# Patient Record
Sex: FEMALE | Race: BLACK OR AFRICAN AMERICAN | NOT HISPANIC OR LATINO | Employment: FULL TIME | URBAN - METROPOLITAN AREA
[De-identification: names, ages, dates, MRNs, and addresses within clinical notes are randomized per-mention and may not be internally consistent; named-entity substitution may affect disease eponyms.]

---

## 2023-11-22 ENCOUNTER — APPOINTMENT (OUTPATIENT)
Dept: PRIMARY CARE | Facility: CLINIC | Age: 58
End: 2023-11-22

## 2023-11-22 PROBLEM — F41.9 ANXIETY DISORDER: Status: ACTIVE | Noted: 2023-11-22

## 2023-11-22 RX ORDER — SERTRALINE HYDROCHLORIDE 25 MG/1
1 TABLET, FILM COATED ORAL DAILY
COMMUNITY
Start: 2021-01-26 | End: 2024-01-29 | Stop reason: ALTCHOICE

## 2024-01-29 ENCOUNTER — OFFICE VISIT (OUTPATIENT)
Dept: PRIMARY CARE | Facility: CLINIC | Age: 59
End: 2024-01-29
Payer: COMMERCIAL

## 2024-01-29 VITALS
SYSTOLIC BLOOD PRESSURE: 178 MMHG | BODY MASS INDEX: 51.91 KG/M2 | WEIGHT: 293 LBS | RESPIRATION RATE: 16 BRPM | HEIGHT: 63 IN | TEMPERATURE: 98.2 F | DIASTOLIC BLOOD PRESSURE: 100 MMHG | HEART RATE: 63 BPM

## 2024-01-29 DIAGNOSIS — Z00.00 ROUTINE GENERAL MEDICAL EXAMINATION AT A HEALTH CARE FACILITY: ICD-10-CM

## 2024-01-29 DIAGNOSIS — R03.0 ELEVATED BLOOD PRESSURE READING: Primary | ICD-10-CM

## 2024-01-29 DIAGNOSIS — Z12.11 ENCOUNTER FOR SCREENING FOR MALIGNANT NEOPLASM OF COLON: ICD-10-CM

## 2024-01-29 DIAGNOSIS — Z12.31 ENCOUNTER FOR SCREENING MAMMOGRAM FOR MALIGNANT NEOPLASM OF BREAST: ICD-10-CM

## 2024-01-29 PROCEDURE — 1036F TOBACCO NON-USER: CPT | Performed by: FAMILY MEDICINE

## 2024-01-29 PROCEDURE — 99396 PREV VISIT EST AGE 40-64: CPT | Performed by: FAMILY MEDICINE

## 2024-01-29 NOTE — ASSESSMENT & PLAN NOTE
Although blood pressure has never been a problem for her in the past she is significantly elevated today for no particular reason.  She is not complaining of any symptoms and the rest of her cardiovascular exam is completely normal.  We will initiate blood work checking for underlying causes of hypertension.  She will get interval readings on a daily basis and follow-up in 2 weeks to review and discuss whether treatment is necessary.  There is a strong family history of high blood pressure and her mother.

## 2024-01-29 NOTE — PATIENT INSTRUCTIONS
You were instructed to use a home blood pressure device to record your blood pressure daily. Record the readings and bring them with you for your next visit so we can review. Try and get recordings at different times each day so we get a better overall view of your blood pressure during the day.

## 2024-01-29 NOTE — PROGRESS NOTES
Subjective   Meghana Munson is a 58 y.o. female who presents for a wellness visit.  HPI    Review of Systems  Visit Vitals  BP (!) 178/100   Pulse 63   Temp 36.8 °C (98.2 °F)   Resp 16      Objective   Physical Exam  Constitutional:       Appearance: Normal appearance.   HENT:      Head: Normocephalic.   Eyes:      Conjunctiva/sclera: Conjunctivae normal.   Cardiovascular:      Rate and Rhythm: Normal rate and regular rhythm.   Pulmonary:      Effort: Pulmonary effort is normal.      Breath sounds: Normal breath sounds.   Musculoskeletal:      Cervical back: Neck supple.   Skin:     General: Skin is warm and dry.   Neurological:      Mental Status: She is alert.       Assessment/Plan    Meghana was seen today for annual exam.  Diagnoses and all orders for this visit:  Elevated blood pressure reading  -     Follow Up [facility] - PCP - Established; Future  Encounter for screening for malignant neoplasm of colon  -     Colonoscopy Screening; Average Risk Patient; Future  Encounter for screening mammogram for malignant neoplasm of breast  -     BI mammo bilateral screening tomosynthesis; Future  Routine general medical examination at a health care facility  -     CBC; Future  -     Comprehensive Metabolic Panel; Future  -     TSH with reflex to Free T4 if abnormal; Future  -     Hepatitis C Antibody; Future  -     Lipid Panel; Future  -     Referral to Obstetrics / Gynecology; Future       Elevated blood pressure reading  Although blood pressure has never been a problem for her in the past she is significantly elevated today for no particular reason.  She is not complaining of any symptoms and the rest of her cardiovascular exam is completely normal.  We will initiate blood work checking for underlying causes of hypertension.  She will get interval readings on a daily basis and follow-up in 2 weeks to review and discuss whether treatment is necessary.  There is a strong family history of high blood pressure  and her mother.

## 2024-02-05 ENCOUNTER — HOSPITAL ENCOUNTER (OUTPATIENT)
Dept: RADIOLOGY | Facility: HOSPITAL | Age: 59
Discharge: HOME | End: 2024-02-05
Payer: COMMERCIAL

## 2024-02-05 DIAGNOSIS — Z12.31 ENCOUNTER FOR SCREENING MAMMOGRAM FOR MALIGNANT NEOPLASM OF BREAST: ICD-10-CM

## 2024-02-05 PROCEDURE — 77063 BREAST TOMOSYNTHESIS BI: CPT

## 2024-02-05 PROCEDURE — 77067 SCR MAMMO BI INCL CAD: CPT | Performed by: RADIOLOGY

## 2024-02-05 PROCEDURE — 77063 BREAST TOMOSYNTHESIS BI: CPT | Performed by: RADIOLOGY

## 2024-02-05 NOTE — RESULT ENCOUNTER NOTE
Please let Meghana Munson know her mammogram was normal.  We recommend repeating this screen in 1 year.

## 2024-02-08 ENCOUNTER — LAB (OUTPATIENT)
Dept: LAB | Facility: LAB | Age: 59
End: 2024-02-08
Payer: COMMERCIAL

## 2024-02-08 DIAGNOSIS — Z00.00 ROUTINE GENERAL MEDICAL EXAMINATION AT A HEALTH CARE FACILITY: ICD-10-CM

## 2024-02-08 LAB
ALBUMIN SERPL BCP-MCNC: 4 G/DL (ref 3.4–5)
ALP SERPL-CCNC: 64 U/L (ref 33–110)
ALT SERPL W P-5'-P-CCNC: 16 U/L (ref 7–45)
ANION GAP SERPL CALC-SCNC: 11 MMOL/L (ref 10–20)
AST SERPL W P-5'-P-CCNC: 15 U/L (ref 9–39)
BILIRUB SERPL-MCNC: 0.5 MG/DL (ref 0–1.2)
BUN SERPL-MCNC: 13 MG/DL (ref 6–23)
CALCIUM SERPL-MCNC: 9.2 MG/DL (ref 8.6–10.3)
CHLORIDE SERPL-SCNC: 104 MMOL/L (ref 98–107)
CHOLEST SERPL-MCNC: 186 MG/DL (ref 0–199)
CHOLESTEROL/HDL RATIO: 7
CO2 SERPL-SCNC: 32 MMOL/L (ref 21–32)
CREAT SERPL-MCNC: 0.86 MG/DL (ref 0.5–1.05)
EGFRCR SERPLBLD CKD-EPI 2021: 78 ML/MIN/1.73M*2
ERYTHROCYTE [DISTWIDTH] IN BLOOD BY AUTOMATED COUNT: 14.7 % (ref 11.5–14.5)
GLUCOSE SERPL-MCNC: 115 MG/DL (ref 74–99)
HCT VFR BLD AUTO: 43.5 % (ref 36–46)
HCV AB SER QL: NONREACTIVE
HDLC SERPL-MCNC: 26.6 MG/DL
HGB BLD-MCNC: 13.8 G/DL (ref 12–16)
LDLC SERPL CALC-MCNC: 127 MG/DL
MCH RBC QN AUTO: 30.8 PG (ref 26–34)
MCHC RBC AUTO-ENTMCNC: 31.7 G/DL (ref 32–36)
MCV RBC AUTO: 97 FL (ref 80–100)
NON HDL CHOLESTEROL: 159 MG/DL (ref 0–149)
NRBC BLD-RTO: 0 /100 WBCS (ref 0–0)
PLATELET # BLD AUTO: 260 X10*3/UL (ref 150–450)
POTASSIUM SERPL-SCNC: 4.4 MMOL/L (ref 3.5–5.3)
PROT SERPL-MCNC: 6.4 G/DL (ref 6.4–8.2)
RBC # BLD AUTO: 4.48 X10*6/UL (ref 4–5.2)
SODIUM SERPL-SCNC: 143 MMOL/L (ref 136–145)
TRIGL SERPL-MCNC: 161 MG/DL (ref 0–149)
TSH SERPL-ACNC: 2.73 MIU/L (ref 0.44–3.98)
VLDL: 32 MG/DL (ref 0–40)
WBC # BLD AUTO: 5.5 X10*3/UL (ref 4.4–11.3)

## 2024-02-08 PROCEDURE — 36415 COLL VENOUS BLD VENIPUNCTURE: CPT

## 2024-02-08 PROCEDURE — 80053 COMPREHEN METABOLIC PANEL: CPT

## 2024-02-08 PROCEDURE — 80061 LIPID PANEL: CPT

## 2024-02-08 PROCEDURE — 85027 COMPLETE CBC AUTOMATED: CPT

## 2024-02-08 PROCEDURE — 86803 HEPATITIS C AB TEST: CPT

## 2024-02-08 PROCEDURE — 84443 ASSAY THYROID STIM HORMONE: CPT

## 2024-02-09 NOTE — RESULT ENCOUNTER NOTE
This is all essentially normal.  There is a very mild elevation in your sugar or glucose which we can follow-up at your next office visit.

## 2024-02-12 ENCOUNTER — OFFICE VISIT (OUTPATIENT)
Dept: PRIMARY CARE | Facility: CLINIC | Age: 59
End: 2024-02-12
Payer: COMMERCIAL

## 2024-02-12 VITALS
RESPIRATION RATE: 18 BRPM | BODY MASS INDEX: 51.91 KG/M2 | HEART RATE: 84 BPM | HEIGHT: 63 IN | TEMPERATURE: 98.3 F | DIASTOLIC BLOOD PRESSURE: 84 MMHG | SYSTOLIC BLOOD PRESSURE: 152 MMHG | WEIGHT: 293 LBS

## 2024-02-12 DIAGNOSIS — I10 HTN (HYPERTENSION), BENIGN: Primary | ICD-10-CM

## 2024-02-12 DIAGNOSIS — R03.0 ELEVATED BLOOD PRESSURE READING: ICD-10-CM

## 2024-02-12 DIAGNOSIS — R73.03 PREDIABETES: ICD-10-CM

## 2024-02-12 LAB — POC HEMOGLOBIN A1C: 6 % (ref 4.2–6.5)

## 2024-02-12 PROCEDURE — 3079F DIAST BP 80-89 MM HG: CPT | Performed by: FAMILY MEDICINE

## 2024-02-12 PROCEDURE — 1036F TOBACCO NON-USER: CPT | Performed by: FAMILY MEDICINE

## 2024-02-12 PROCEDURE — 3077F SYST BP >= 140 MM HG: CPT | Performed by: FAMILY MEDICINE

## 2024-02-12 PROCEDURE — 83036 HEMOGLOBIN GLYCOSYLATED A1C: CPT | Performed by: FAMILY MEDICINE

## 2024-02-12 PROCEDURE — 99214 OFFICE O/P EST MOD 30 MIN: CPT | Performed by: FAMILY MEDICINE

## 2024-02-12 RX ORDER — AMLODIPINE BESYLATE 5 MG/1
5 TABLET ORAL DAILY
Qty: 30 TABLET | Refills: 5 | Status: SHIPPED | OUTPATIENT
Start: 2024-02-12 | End: 2024-05-06 | Stop reason: SDUPTHER

## 2024-02-12 NOTE — ASSESSMENT & PLAN NOTE
Lab Results   Component Value Date    HGBA1C 6.0 02/12/2024     Glucose was mildly elevated on screening and A1c indicates prediabetes.  We discussed the importance of reducing carbohydrates and sugars going forward and we will check the A1c again in 6 months

## 2024-02-12 NOTE — ASSESSMENT & PLAN NOTE
Interval readings are consistent with our readings here in the office indicating that she has developed hypertension.  Lab work shows normal thyroid studies, kidney numbers and a previous EKG was normal.  We will initiate treatment with a starting dose of amlodipine 5 mg daily.  She will continue to monitor blood pressure and follow-up in 3 months

## 2024-02-12 NOTE — PROGRESS NOTES
Subjective   Meghana Munson is a 58 y.o. female who presents for Hypertension.  HPI         Meghana Munson is here for a hypertension follow-up:    Medication treatment:  not currently on medications for this problem  Recent BP readings: usual 150s/90s  Symptoms: no cough, headache, blurred vision, chest pain, or shortness of breath   Visit Vitals  /84   Pulse 84   Temp 36.8 °C (98.3 °F)   Resp 18      Objective   Physical Exam  Constitutional:       Appearance: Normal appearance.   HENT:      Head: Normocephalic.   Eyes:      Conjunctiva/sclera: Conjunctivae normal.   Cardiovascular:      Rate and Rhythm: Normal rate and regular rhythm.   Pulmonary:      Effort: Pulmonary effort is normal.      Breath sounds: Normal breath sounds.   Musculoskeletal:      Cervical back: Neck supple.   Skin:     General: Skin is warm and dry.   Neurological:      Mental Status: She is alert.       Assessment/Plan      Problem List Items Addressed This Visit       HTN (hypertension), benign - Primary     Interval readings are consistent with our readings here in the office indicating that she has developed hypertension.  Lab work shows normal thyroid studies, kidney numbers and a previous EKG was normal.  We will initiate treatment with a starting dose of amlodipine 5 mg daily.  She will continue to monitor blood pressure and follow-up in 3 months         Relevant Medications    amLODIPine (Norvasc) 5 mg tablet    Other Relevant Orders    Follow Up [facility] - PCP - Established    Prediabetes     Lab Results   Component Value Date    HGBA1C 6.0 02/12/2024   Glucose was mildly elevated on screening and A1c indicates prediabetes.  We discussed the importance of reducing carbohydrates and sugars going forward and we will check the A1c again in 6 months

## 2024-04-03 ENCOUNTER — TELEPHONE (OUTPATIENT)
Dept: GASTROENTEROLOGY | Facility: CLINIC | Age: 59
End: 2024-04-03
Payer: COMMERCIAL

## 2024-05-06 ENCOUNTER — OFFICE VISIT (OUTPATIENT)
Dept: PRIMARY CARE | Facility: CLINIC | Age: 59
End: 2024-05-06
Payer: COMMERCIAL

## 2024-05-06 VITALS
TEMPERATURE: 97.5 F | HEART RATE: 60 BPM | RESPIRATION RATE: 16 BRPM | BODY MASS INDEX: 51.91 KG/M2 | HEIGHT: 63 IN | WEIGHT: 293 LBS | DIASTOLIC BLOOD PRESSURE: 92 MMHG | SYSTOLIC BLOOD PRESSURE: 142 MMHG

## 2024-05-06 DIAGNOSIS — I10 HTN (HYPERTENSION), BENIGN: ICD-10-CM

## 2024-05-06 PROCEDURE — 99214 OFFICE O/P EST MOD 30 MIN: CPT | Performed by: FAMILY MEDICINE

## 2024-05-06 PROCEDURE — 3077F SYST BP >= 140 MM HG: CPT | Performed by: FAMILY MEDICINE

## 2024-05-06 PROCEDURE — 1036F TOBACCO NON-USER: CPT | Performed by: FAMILY MEDICINE

## 2024-05-06 PROCEDURE — 3080F DIAST BP >= 90 MM HG: CPT | Performed by: FAMILY MEDICINE

## 2024-05-06 RX ORDER — AMLODIPINE BESYLATE 10 MG/1
10 TABLET ORAL DAILY
Qty: 30 TABLET | Refills: 5 | Status: SHIPPED | OUTPATIENT
Start: 2024-05-06 | End: 2024-11-02

## 2024-05-06 NOTE — PROGRESS NOTES
Subjective   Meghana Munson is a 58 y.o. female who presents for Hypertension.  HPI         Meghana Munson is here for a hypertension follow-up:    Medication treatment:  taking as prescribed  Recent BP readings: not doing  Symptoms: no cough, headache, blurred vision, chest pain, or shortness of breath   Visit Vitals  BP (!) 142/92   Pulse 60   Temp 36.4 °C (97.5 °F)   Resp 16      Objective   Physical Exam  Constitutional:       Appearance: Normal appearance.   HENT:      Head: Normocephalic.   Eyes:      Conjunctiva/sclera: Conjunctivae normal.   Cardiovascular:      Rate and Rhythm: Normal rate and regular rhythm.      Heart sounds: Normal heart sounds.   Pulmonary:      Effort: Pulmonary effort is normal.      Breath sounds: Normal breath sounds.   Musculoskeletal:      Cervical back: Neck supple.      Right lower leg: No edema.      Left lower leg: No edema.   Skin:     General: Skin is warm and dry.   Neurological:      Mental Status: She is alert.       Assessment/Plan      Problem List Items Addressed This Visit       HTN (hypertension), benign     Unfortunately blood pressure has risen above therapeutic range on this visit and the prior visit as well.  I believe we need an increased dose of medication so we will increase her amlodipine from 5 mg to 10 mg and follow-up in 3 months to check.  We discussed the fact that this can cause some minor swelling of the ankles.  She has no edema currently on exam.  If she gets significant ankle edema she will call back for a med adjustment         Relevant Medications    amLODIPine (Norvasc) 10 mg tablet    Other Relevant Orders    Follow [facility] - PCP - Established          Please excuse any errors in grammar or translation related to this dictation. Voice recognition software was utilized to prepare this document.

## 2024-05-06 NOTE — ASSESSMENT & PLAN NOTE
Unfortunately blood pressure has risen above therapeutic range on this visit and the prior visit as well.  I believe we need an increased dose of medication so we will increase her amlodipine from 5 mg to 10 mg and follow-up in 3 months to check.  We discussed the fact that this can cause some minor swelling of the ankles.  She has no edema currently on exam.  If she gets significant ankle edema she will call back for a med adjustment

## 2024-08-26 ENCOUNTER — APPOINTMENT (OUTPATIENT)
Dept: PRIMARY CARE | Facility: CLINIC | Age: 59
End: 2024-08-26
Payer: COMMERCIAL

## 2024-08-26 VITALS
HEIGHT: 63 IN | HEART RATE: 64 BPM | WEIGHT: 293 LBS | TEMPERATURE: 98.2 F | DIASTOLIC BLOOD PRESSURE: 94 MMHG | RESPIRATION RATE: 18 BRPM | BODY MASS INDEX: 51.91 KG/M2 | SYSTOLIC BLOOD PRESSURE: 136 MMHG

## 2024-08-26 DIAGNOSIS — I10 HTN (HYPERTENSION), BENIGN: ICD-10-CM

## 2024-08-26 DIAGNOSIS — R73.03 PREDIABETES: ICD-10-CM

## 2024-08-26 LAB — POC HEMOGLOBIN A1C: 6.5 % (ref 4.2–6.5)

## 2024-08-26 PROCEDURE — 1036F TOBACCO NON-USER: CPT | Performed by: FAMILY MEDICINE

## 2024-08-26 PROCEDURE — 3008F BODY MASS INDEX DOCD: CPT | Performed by: FAMILY MEDICINE

## 2024-08-26 PROCEDURE — 99214 OFFICE O/P EST MOD 30 MIN: CPT | Performed by: FAMILY MEDICINE

## 2024-08-26 PROCEDURE — 3075F SYST BP GE 130 - 139MM HG: CPT | Performed by: FAMILY MEDICINE

## 2024-08-26 PROCEDURE — 83036 HEMOGLOBIN GLYCOSYLATED A1C: CPT | Performed by: FAMILY MEDICINE

## 2024-08-26 PROCEDURE — 3080F DIAST BP >= 90 MM HG: CPT | Performed by: FAMILY MEDICINE

## 2024-08-26 RX ORDER — AMLODIPINE BESYLATE 10 MG/1
10 TABLET ORAL DAILY
Qty: 30 TABLET | Refills: 11 | Status: SHIPPED | OUTPATIENT
Start: 2024-08-26

## 2024-08-26 RX ORDER — LOSARTAN POTASSIUM 50 MG/1
50 TABLET ORAL DAILY
Qty: 30 TABLET | Refills: 11 | Status: SHIPPED | OUTPATIENT
Start: 2024-08-26 | End: 2025-08-26

## 2024-08-26 NOTE — ASSESSMENT & PLAN NOTE
Blood pressure is significantly better after titrating up on amlodipine but is still not at goal with a diastolic of 94.  Will go ahead and add 50 mg of losartan, continue to monitor, and follow-up in 3 months to check blood pressure again  Orders:    Follow Up [facility] - PCP - Established    losartan (Cozaar) 50 mg tablet; Take 1 tablet (50 mg) by mouth once daily.    amLODIPine (Norvasc) 10 mg tablet; Take 1 tablet (10 mg) by mouth once daily.    Follow Up [facility] - PCP - Established; Future

## 2024-08-26 NOTE — ASSESSMENT & PLAN NOTE
Unfortunately the A1c has risen from 6.0% to 6.5% today.  We discussed the importance of reducing carbohydrates and sugars in the diet to stop this progression and hopefully reverse it.  We will bring her back in 3 months to check another A1c to ensure that she is not rising above 7%  Orders:    POCT glycosylated hemoglobin (Hb A1C) manually resulted

## 2024-08-26 NOTE — PROGRESS NOTES
Subjective   Meghana Munson is a 59 y.o. female who presents for Hypertension.     HPI         Meghana Munson is here for a hypertension follow-up:    Medication treatment:  taking as prescribed  Recent BP readings: not doing  Symptoms: no cough, headache, blurred vision, chest pain, or shortness of breath   Visit Vitals  BP (!) 136/94   Pulse 64   Temp 36.8 °C (98.2 °F)   Resp 18      Objective   Physical Exam  Constitutional:       Appearance: Normal appearance.   HENT:      Head: Normocephalic.   Eyes:      Conjunctiva/sclera: Conjunctivae normal.   Cardiovascular:      Rate and Rhythm: Normal rate and regular rhythm.      Heart sounds: Normal heart sounds.   Pulmonary:      Effort: Pulmonary effort is normal.      Breath sounds: Normal breath sounds.   Musculoskeletal:      Cervical back: Neck supple.   Skin:     General: Skin is warm and dry.   Neurological:      Mental Status: She is alert.            Assessment & Plan  Prediabetes  Unfortunately the A1c has risen from 6.0% to 6.5% today.  We discussed the importance of reducing carbohydrates and sugars in the diet to stop this progression and hopefully reverse it.  We will bring her back in 3 months to check another A1c to ensure that she is not rising above 7%  Orders:    POCT glycosylated hemoglobin (Hb A1C) manually resulted    HTN (hypertension), benign  Blood pressure is significantly better after titrating up on amlodipine but is still not at goal with a diastolic of 94.  Will go ahead and add 50 mg of losartan, continue to monitor, and follow-up in 3 months to check blood pressure again  Orders:    Follow [facility] - PCP - Established    losartan (Cozaar) 50 mg tablet; Take 1 tablet (50 mg) by mouth once daily.    amLODIPine (Norvasc) 10 mg tablet; Take 1 tablet (10 mg) by mouth once daily.    Follow [facility] - PCP - Established; Future           Please excuse any errors in grammar or translation related to this  dictation. Voice recognition software was utilized to prepare this document.

## 2024-09-20 ENCOUNTER — APPOINTMENT (OUTPATIENT)
Dept: PRIMARY CARE | Facility: CLINIC | Age: 59
End: 2024-09-20
Payer: COMMERCIAL

## 2024-09-20 VITALS
WEIGHT: 293 LBS | SYSTOLIC BLOOD PRESSURE: 147 MMHG | RESPIRATION RATE: 20 BRPM | BODY MASS INDEX: 51.91 KG/M2 | DIASTOLIC BLOOD PRESSURE: 88 MMHG | HEIGHT: 63 IN | HEART RATE: 82 BPM | TEMPERATURE: 98.2 F

## 2024-09-20 DIAGNOSIS — E11.9 TYPE 2 DIABETES MELLITUS WITHOUT COMPLICATION, WITHOUT LONG-TERM CURRENT USE OF INSULIN (MULTI): ICD-10-CM

## 2024-09-20 DIAGNOSIS — I10 HTN (HYPERTENSION), BENIGN: ICD-10-CM

## 2024-09-20 DIAGNOSIS — E66.01 CLASS 3 SEVERE OBESITY DUE TO EXCESS CALORIES WITH SERIOUS COMORBIDITY AND BODY MASS INDEX (BMI) OF 50.0 TO 59.9 IN ADULT: Primary | ICD-10-CM

## 2024-09-20 PROBLEM — E66.813 CLASS 3 SEVERE OBESITY DUE TO EXCESS CALORIES WITH SERIOUS COMORBIDITY AND BODY MASS INDEX (BMI) OF 50.0 TO 59.9 IN ADULT: Status: ACTIVE | Noted: 2024-09-20

## 2024-09-20 PROCEDURE — 3077F SYST BP >= 140 MM HG: CPT | Performed by: FAMILY MEDICINE

## 2024-09-20 PROCEDURE — 3049F LDL-C 100-129 MG/DL: CPT | Performed by: FAMILY MEDICINE

## 2024-09-20 PROCEDURE — 3008F BODY MASS INDEX DOCD: CPT | Performed by: FAMILY MEDICINE

## 2024-09-20 PROCEDURE — 99214 OFFICE O/P EST MOD 30 MIN: CPT | Performed by: FAMILY MEDICINE

## 2024-09-20 PROCEDURE — 3079F DIAST BP 80-89 MM HG: CPT | Performed by: FAMILY MEDICINE

## 2024-09-20 PROCEDURE — 4010F ACE/ARB THERAPY RXD/TAKEN: CPT | Performed by: FAMILY MEDICINE

## 2024-09-20 PROCEDURE — 1036F TOBACCO NON-USER: CPT | Performed by: FAMILY MEDICINE

## 2024-09-20 NOTE — PROGRESS NOTES
Subjective   Meghana Munson is a 59 y.o. female who presents for Obesity.     HPI        She is here today to discuss weight loss medication.     Visit Vitals  /88   Pulse 82   Temp 36.8 °C (98.2 °F)   Resp 20      Objective   Physical Exam  Constitutional:       Appearance: Normal appearance.   HENT:      Head: Normocephalic.   Pulmonary:      Effort: Pulmonary effort is normal.   Musculoskeletal:      Cervical back: Neck supple.   Skin:     General: Skin is warm and dry.   Psychiatric:         Mood and Affect: Mood normal.            Assessment & Plan  Class 3 severe obesity due to excess calories with serious comorbidity and body mass index (BMI) of 50.0 to 59.9 in adult (Multi)  This 59-year-old female has struggled with weight her entire life and currently has a body mass index of 55.4.  Additionally she went from prediabetic to diabetic at her last visit with an A1c above 6.4%.  She is interested in more aggressive weight loss management to both help with the diabetes, hypertension, and obesity.  We discussed the benefits of GLP-1 medication which I think would be ideal for her.  I discussed the pros and cons and what to expect with this medication.  We will start her at low-dose Zepbound and enroll her in our pharmacy consultation service to help with the titration and/or prior authorization.  Orders:    tirzepatide, weight loss, (Zepbound) 2.5 mg/0.5 mL injection; Inject 2.5 mg under the skin every 7 days.    Follow Up [facility] - Pharmacy; Future    Type 2 diabetes mellitus without complication, without long-term current use of insulin (Multi)    Orders:    tirzepatide, weight loss, (Zepbound) 2.5 mg/0.5 mL injection; Inject 2.5 mg under the skin every 7 days.    Follow Up [facility] - Pharmacy; Future    HTN (hypertension), benign                Please excuse any errors in grammar or translation related to this dictation. Voice recognition software was utilized to prepare  this document.

## 2024-09-20 NOTE — ASSESSMENT & PLAN NOTE
Orders:    tirzepatide, weight loss, (Zepbound) 2.5 mg/0.5 mL injection; Inject 2.5 mg under the skin every 7 days.    Follow Up [facility] - Pharmacy; Future

## 2024-09-20 NOTE — ASSESSMENT & PLAN NOTE
This 59-year-old female has struggled with weight her entire life and currently has a body mass index of 55.4.  Additionally she went from prediabetic to diabetic at her last visit with an A1c above 6.4%.  She is interested in more aggressive weight loss management to both help with the diabetes, hypertension, and obesity.  We discussed the benefits of GLP-1 medication which I think would be ideal for her.  I discussed the pros and cons and what to expect with this medication.  We will start her at low-dose Zepbound and enroll her in our pharmacy consultation service to help with the titration and/or prior authorization.  Orders:    tirzepatide, weight loss, (Zepbound) 2.5 mg/0.5 mL injection; Inject 2.5 mg under the skin every 7 days.    Follow Up [facility] - Pharmacy; Future

## 2024-09-27 ENCOUNTER — APPOINTMENT (OUTPATIENT)
Dept: PRIMARY CARE | Facility: CLINIC | Age: 59
End: 2024-09-27
Payer: COMMERCIAL

## 2024-10-11 ENCOUNTER — APPOINTMENT (OUTPATIENT)
Dept: PHARMACY | Facility: HOSPITAL | Age: 59
End: 2024-10-11
Payer: COMMERCIAL

## 2024-10-11 DIAGNOSIS — E11.9 TYPE 2 DIABETES MELLITUS WITHOUT COMPLICATION, WITHOUT LONG-TERM CURRENT USE OF INSULIN (MULTI): ICD-10-CM

## 2024-10-11 DIAGNOSIS — E66.813 CLASS 3 SEVERE OBESITY DUE TO EXCESS CALORIES WITH SERIOUS COMORBIDITY AND BODY MASS INDEX (BMI) OF 50.0 TO 59.9 IN ADULT: Primary | ICD-10-CM

## 2024-10-11 DIAGNOSIS — E66.01 CLASS 3 SEVERE OBESITY DUE TO EXCESS CALORIES WITH SERIOUS COMORBIDITY AND BODY MASS INDEX (BMI) OF 50.0 TO 59.9 IN ADULT: Primary | ICD-10-CM

## 2024-10-11 PROCEDURE — RXMED WILLOW AMBULATORY MEDICATION CHARGE

## 2024-10-11 NOTE — PROGRESS NOTES
[facility] Pharmacist Visit - Weight Management  Meghana Munson is a 59 y.o. female was referred to Clinical Pharmacy Team for Obesity.    Referring Provider: Kings Billingsley MD  PCP: Kings Billingsley MD - last visit: 9/20/24, next visit:     Subjective   HPI  PMH significant for HTN.  Special needs/barriers to therapy: none    WEIGHT MANAGEMENT  BMI Readings from Last 1 Encounters:   09/20/24 55.44 kg/m²   Starting weight: 308 lbs (140 kg)  Current weight: 308 lbs    Lab Results   Component Value Date    HGBA1C 6.5 08/26/2024    GLUCOSE 115 (H) 02/08/2024   Hyperglycemia: Denies signs and symptoms    On zepbound for 3 weeks now. Denies n/v, constipation, diarrhea, or stomach upset. Noticed eating less and appetite suppression. More energy.    Lifestyle  Diet: eats whenever when schedule fits, a lot of eat outs  Snacks: unhealthy foods  Drinks: water  Has worked with nutritionist/dietician? No    Physical Activity: rarely    Other Potential Contributing Factors  Alcohol use: Average 0 drinks/week  Medications that may cause weight gain: none  Mental health: No current concerns  Comorbidities: hypertension    Non-pharmacological therapy  Weight loss techniques attempted:  Self-directed dieting: weight watchers    Pharmacological therapy  Current Medications: Zepbound 2.5mg - for 3 weeks now  Previous Medications: none     Adherence: Takes medication as directed and reports no missed doses  Adverse Effects: Vicodin allergy    Insurance coverage of weight-loss medications? Yes,    Eligible for copay cards/programs? Yes, commercial    Medication Patient Risks/Cautions Notes   Wegovy (semaglutide) None    Zepbound (tirzepatide) None    Saxenda (liraglutide) None Current backorder of starting doses   Qsymia (phentermine-topiramate) None Controlled substance   Contrave (bupropion-naltrexone) None    Adipex (phentermine) None Controlled substance,  Short-term use only   Adrian/Xenical (orlistat)  Adverse effects likely outweigh  benefit.     Denies hx of thyroid, pancreatitis, gastroparesis, or gastric bypass surgery.  Denies hx of seizures or chronic opioid use.      Secondary Prevention  ASCVD Risk:   The 10-year ASCVD risk score (Irma BLOOM, et al., 2019) is: 30.9%    Values used to calculate the score:      Age: 59 years      Sex: Female      Is Non- : Yes      Diabetic: Yes      Tobacco smoker: No      Systolic Blood Pressure: 147 mmHg      Is BP treated: Yes      HDL Cholesterol: 26.6 mg/dL      Total Cholesterol: 186 mg/dL  On Statin?: No,      Immunizations Needed: All up-to-date and documented  Tobacco Use: non-smoker    Objective   Vitals  BP Readings from Last 2 Encounters:   09/20/24 147/88   08/26/24 (!) 136/94     BMI Readings from Last 1 Encounters:   09/20/24 55.44 kg/m²      Labs  A1C  Lab Results   Component Value Date    HGBA1C 6.5 08/26/2024    HGBA1C 6.0 02/12/2024     BMP  Lab Results   Component Value Date    CALCIUM 9.2 02/08/2024     02/08/2024    K 4.4 02/08/2024    CO2 32 02/08/2024     02/08/2024    BUN 13 02/08/2024    CREATININE 0.86 02/08/2024    EGFR 78 02/08/2024     LFTs  Lab Results   Component Value Date    ALT 16 02/08/2024    AST 15 02/08/2024    ALKPHOS 64 02/08/2024    BILITOT 0.5 02/08/2024     FLP  Lab Results   Component Value Date    TRIG 161 (H) 02/08/2024    CHOL 186 02/08/2024    LDLCALC 127 (H) 02/08/2024    HDL 26.6 02/08/2024       Assessment/Plan   Problem List Items Addressed This Visit    None      Weight Management: Current regimen includes Zepbound 2.5mg subcutaneous weekly. Patient's current weight reported as 308 lbs. Due to success, plan to increase dose.  INCREASE dose of Zepbound to 5mg subcutaneous once weekly  Educate on side effects of dose increase  Reinforce patient that to maximize weight loss and prevent return weight gain, patient has to combine GLP-1 with good diet and exercise  DASH Diet  150 mins per week of exercise  FUV in 4 weeks  regarding side effects and dose titration  Continue to focus on lifestyle modifications as discussed.    Patient Education:  Counseled patient on relevant MOA, expectations, side effects, duration of therapy, contraindications, administration, and monitoring parameters.  All questions and concerns addressed. Contact pharmacist with any further questions or concerns prior to next appointment.  Reviewed dietary recommendations: Healthy Plate method    Clinical Pharmacist follow-up: 11/8, Telehealth visit    Seth Vogt PharmD  Clinical Pharmacist  10/11/24    Continue all meds under the continuation of care with the referring provider and clinical pharmacy team.  Verbal consent to manage patient's drug therapy was obtained from the patient. They were informed they may decline to participate or withdraw from participation in pharmacy services at any time.

## 2024-10-14 ENCOUNTER — PHARMACY VISIT (OUTPATIENT)
Dept: PHARMACY | Facility: CLINIC | Age: 59
End: 2024-10-14
Payer: COMMERCIAL

## 2024-10-16 ENCOUNTER — APPOINTMENT (OUTPATIENT)
Dept: PHARMACY | Facility: HOSPITAL | Age: 59
End: 2024-10-16
Payer: COMMERCIAL

## 2024-11-08 ENCOUNTER — APPOINTMENT (OUTPATIENT)
Dept: PHARMACY | Facility: HOSPITAL | Age: 59
End: 2024-11-08
Payer: COMMERCIAL

## 2024-11-08 DIAGNOSIS — E66.01 CLASS 3 SEVERE OBESITY DUE TO EXCESS CALORIES WITH SERIOUS COMORBIDITY AND BODY MASS INDEX (BMI) OF 50.0 TO 59.9 IN ADULT: ICD-10-CM

## 2024-11-08 DIAGNOSIS — E11.9 TYPE 2 DIABETES MELLITUS WITHOUT COMPLICATION, WITHOUT LONG-TERM CURRENT USE OF INSULIN (MULTI): Primary | ICD-10-CM

## 2024-11-08 DIAGNOSIS — E66.813 CLASS 3 SEVERE OBESITY DUE TO EXCESS CALORIES WITH SERIOUS COMORBIDITY AND BODY MASS INDEX (BMI) OF 50.0 TO 59.9 IN ADULT: ICD-10-CM

## 2024-11-08 PROCEDURE — RXMED WILLOW AMBULATORY MEDICATION CHARGE

## 2024-11-08 NOTE — PROGRESS NOTES
[facility] Pharmacist Visit - Weight Management  Meghana Munson is a 59 y.o. female was referred to Clinical Pharmacy Team for Obesity and Diabetes.    Referring Provider: Kings Billingsley MD  PCP: Kings Billingsley MD - last visit: 11/8/2024, next visit: 12/5/2024 @ 14:00    Subjective   HPI  PMH significant for HTN.  Special needs/barriers to therapy: None    WEIGHT MANAGEMENT  BMI Readings from Last 1 Encounters:   09/20/24 55.44 kg/m²   Starting weight: 308 lbs (140 kg)  Current weight: 293 lbs    Lab Results   Component Value Date    HGBA1C 6.5 08/26/2024    GLUCOSE 115 (H) 02/08/2024   Hyperglycemia: Denies signs and symptoms    Lifestyle  Diet: has experienced a decrease in appetite but is making sure she intakes proper amounts of protein and fiber with her meals    Has worked with nutritionist/dietician? No    Physical Activity: Walking 5-6 times/week, average of 7,000-10,000 steps/day, weight lifting and dancing     Other Potential Contributing Factors  Alcohol use: Average 0 drinks/week  Medications that may cause weight gain: none  Mental health: No current concerns  Comorbidities: hypertension    Non-pharmacological therapy  Weight loss techniques attempted:  Self-directed dieting: Weight Watchers    Pharmacological therapy  Current Medications: Zepbound 5 mg/0.5 mL subcutaneous weekly  Previous Medications: none     Adherence: Takes medication as directed and reports no missed doses  Adverse Effects: Vicodin Allergy    Insurance coverage of weight-loss medications? Yes, commercial  Eligible for copay cards/programs? Yes, commercial    Medication Patient Risks/Cautions Notes   Wegovy (semaglutide) None    Zepbound (tirzepatide) None    Saxenda (liraglutide) None Current backorder of starting doses   Qsymia (phentermine-topiramate) None Controlled substance   Contrave (bupropion-naltrexone) None    Adipex (phentermine) None Controlled substance,  Short-term use only   Adrian/Xenical (orlistat)  Adverse effects  likely outweigh benefit.     Denies hx of thyroid, pancreatitis, gastroparesis, or gastric bypass surgery.  Denies hx of seizures or chronic opioid use.      Secondary Prevention  ASCVD Risk:   The 10-year ASCVD risk score (Irma BLOOM, et al., 2019) is: 30.9%    Values used to calculate the score:      Age: 59 years      Sex: Female      Is Non- : Yes      Diabetic: Yes      Tobacco smoker: No      Systolic Blood Pressure: 147 mmHg      Is BP treated: Yes      HDL Cholesterol: 26.6 mg/dL      Total Cholesterol: 186 mg/dL  On Statin?: No,      Immunizations Needed: All up-to-date and documented  Tobacco Use: non-smoker    Objective   Vitals  BP Readings from Last 2 Encounters:   09/20/24 147/88   08/26/24 (!) 136/94     BMI Readings from Last 1 Encounters:   09/20/24 55.44 kg/m²      Labs  A1C  Lab Results   Component Value Date    HGBA1C 6.5 08/26/2024    HGBA1C 6.0 02/12/2024     BMP  Lab Results   Component Value Date    CALCIUM 9.2 02/08/2024     02/08/2024    K 4.4 02/08/2024    CO2 32 02/08/2024     02/08/2024    BUN 13 02/08/2024    CREATININE 0.86 02/08/2024    EGFR 78 02/08/2024     LFTs  Lab Results   Component Value Date    ALT 16 02/08/2024    AST 15 02/08/2024    ALKPHOS 64 02/08/2024    BILITOT 0.5 02/08/2024     FLP  Lab Results   Component Value Date    TRIG 161 (H) 02/08/2024    CHOL 186 02/08/2024    LDLCALC 127 (H) 02/08/2024    HDL 26.6 02/08/2024       Assessment/Plan   Problem List Items Addressed This Visit    None      Weight Management: Current regimen includes Zepbound 5 mg/0.5 mL subcutaneous weekly. Patient's current weight reported as 293 lbs. Has lost 15 lbs (4.8% of TBW) since starting therapy plan. Due to success, plan to increase dose.  INCREASE dose of Zepbound to 7.5mg subcutaneous once weekly  Educate on side effects of dose increase  Reinforce patient that to maximize weight loss and prevent return weight gain, patient has to combine GLP-1 with  good diet and exercise  DASH Diet  150 mins per week of exercise  FUV in 4 weeks regarding side effects and dose titration  Continue to focus on lifestyle modifications as discussed.    Patient Education:  Counseled patient on relevant MOA, expectations, side effects, duration of therapy, contraindications, administration, and monitoring parameters.  All questions and concerns addressed. Contact pharmacist with any further questions or concerns prior to next appointment.  Reviewed dietary recommendations: Healthy Plate method    Clinical Pharmacist follow-up: 12/5/2024 @14:00, Telehealth visit    Seth Vogt PharmD  Pharmacy Student  11/08/24    Continue all meds under the continuation of care with the referring provider and clinical pharmacy team.  Verbal consent to manage patient's drug therapy was obtained from the patient. They were informed they may decline to participate or withdraw from participation in pharmacy services at any time.

## 2024-11-13 ENCOUNTER — PHARMACY VISIT (OUTPATIENT)
Dept: PHARMACY | Facility: CLINIC | Age: 59
End: 2024-11-13
Payer: COMMERCIAL

## 2024-12-02 ENCOUNTER — APPOINTMENT (OUTPATIENT)
Dept: PRIMARY CARE | Facility: CLINIC | Age: 59
End: 2024-12-02
Payer: COMMERCIAL

## 2024-12-02 VITALS
WEIGHT: 282 LBS | DIASTOLIC BLOOD PRESSURE: 78 MMHG | SYSTOLIC BLOOD PRESSURE: 116 MMHG | HEIGHT: 63 IN | RESPIRATION RATE: 16 BRPM | TEMPERATURE: 97.9 F | HEART RATE: 76 BPM | BODY MASS INDEX: 49.96 KG/M2

## 2024-12-02 DIAGNOSIS — E11.9 TYPE 2 DIABETES MELLITUS WITHOUT COMPLICATION, UNSPECIFIED WHETHER LONG TERM INSULIN USE (MULTI): ICD-10-CM

## 2024-12-02 DIAGNOSIS — I10 HTN (HYPERTENSION), BENIGN: ICD-10-CM

## 2024-12-02 LAB — POC HEMOGLOBIN A1C: 5.8 % (ref 4.2–6.5)

## 2024-12-02 PROCEDURE — 1036F TOBACCO NON-USER: CPT | Performed by: FAMILY MEDICINE

## 2024-12-02 PROCEDURE — 3008F BODY MASS INDEX DOCD: CPT | Performed by: FAMILY MEDICINE

## 2024-12-02 PROCEDURE — 4010F ACE/ARB THERAPY RXD/TAKEN: CPT | Performed by: FAMILY MEDICINE

## 2024-12-02 PROCEDURE — 3074F SYST BP LT 130 MM HG: CPT | Performed by: FAMILY MEDICINE

## 2024-12-02 PROCEDURE — 3078F DIAST BP <80 MM HG: CPT | Performed by: FAMILY MEDICINE

## 2024-12-02 PROCEDURE — 3049F LDL-C 100-129 MG/DL: CPT | Performed by: FAMILY MEDICINE

## 2024-12-02 PROCEDURE — 83036 HEMOGLOBIN GLYCOSYLATED A1C: CPT | Performed by: FAMILY MEDICINE

## 2024-12-02 PROCEDURE — 99213 OFFICE O/P EST LOW 20 MIN: CPT | Performed by: FAMILY MEDICINE

## 2024-12-02 NOTE — PROGRESS NOTES
Subjective   Meghana Munson is a 59 y.o. female who presents for Diabetes.  HPI  This is a diabetes follow up visit for Meghana Munson. The current treatment includes  Tirzepatide  and she is compliant most of the time. Symptoms include none. Glucose is monitored: not checking. she reports good compliance with a low carbohydrate diet, and exercises infrequently .    Patient is prescribed an ACE/ARB/ARNI medication   Patient is not prescribed a STATIN medication  Patient is not prescribed a SGLT2/GLP1 medication    Last Flu Vaccine given:                       Last Pneumococcal Vaccine given:      Lab Results   Component Value Date    HGBA1C 6.5 08/26/2024    CREATININE 0.86 02/08/2024    LDLCALC 127 (H) 02/08/2024        Last Eye Exam: 9/2024      Liver Screening: FIB-4 Calculation: 0.84 at 2/8/2024  7:29 AM  Calculated from:  SGOT/AST: 15 U/L at 2/8/2024  7:29 AM  SGPT/ALT: 16 U/L at 2/8/2024  7:29 AM  Platelets: 260 x10*3/uL at 2/8/2024  7:29 AM  Age: 58 years  Interpretation: if<1.45 Cirrhosis less likely, 1.45 - 3.25 Indeterminate, >3.25 Cirrhosis more likely    Review of Systems  Visit Vitals  /78   Pulse 76   Temp 36.6 °C (97.9 °F)   Resp 16   Body mass index is 50.76 kg/m².   Objective   Physical Exam  Constitutional:       Appearance: Normal appearance.   HENT:      Head: Normocephalic.   Eyes:      Conjunctiva/sclera: Conjunctivae normal.   Cardiovascular:      Rate and Rhythm: Normal rate and regular rhythm.      Heart sounds: Normal heart sounds.   Pulmonary:      Effort: Pulmonary effort is normal.      Breath sounds: Normal breath sounds.   Musculoskeletal:      Cervical back: Neck supple.   Skin:     General: Skin is warm and dry.   Neurological:      Mental Status: She is alert.             Assessment & Plan  Type 2 diabetes mellitus without complication, unspecified whether long term insulin use (Multi)  A1c continues to improve and weight continues to decline significantly with the GLP-1  treatment.  She is taking a statin medication and her blood pressure is still tolerating this well.  We discussed the possibility of starting a statin but her diabetes is so well-controlled that I would say the statistical benefit is minimal especially if her A1c drops below 5.7%.  She would like to hold off on this and reevaluate after 6 months and seeing what her weight and A1c does.  Orders:    POCT glycosylated hemoglobin (Hb A1C) manually resulted    Follow Up [facility] - PCP; Future    HTN (hypertension), benign  This is extremely well-controlled with amlodipine and lisinopril.  She is not reporting any hypoglycemia.  We discussed signs and symptoms of this and if she does get hypoglycemia we will reduce the dose of her amlodipine.  Orders:    Follow Up [facility] - PCP - Established    Follow Up [facility] - PCP; Future              Arleen Smith CMA 12/02/24 3:39 PM

## 2024-12-02 NOTE — ASSESSMENT & PLAN NOTE
This is extremely well-controlled with amlodipine and lisinopril.  She is not reporting any hypoglycemia.  We discussed signs and symptoms of this and if she does get hypoglycemia we will reduce the dose of her amlodipine.  Orders:    Follow Up [facility] - PCP - Established    Follow Up [facility] - PCP; Future

## 2024-12-05 ENCOUNTER — APPOINTMENT (OUTPATIENT)
Dept: PHARMACY | Facility: HOSPITAL | Age: 59
End: 2024-12-05
Payer: COMMERCIAL

## 2024-12-05 DIAGNOSIS — E11.9 TYPE 2 DIABETES MELLITUS WITHOUT COMPLICATION, WITHOUT LONG-TERM CURRENT USE OF INSULIN (MULTI): Primary | ICD-10-CM

## 2024-12-05 DIAGNOSIS — E66.01 CLASS 3 SEVERE OBESITY DUE TO EXCESS CALORIES WITH SERIOUS COMORBIDITY AND BODY MASS INDEX (BMI) OF 50.0 TO 59.9 IN ADULT: ICD-10-CM

## 2024-12-05 DIAGNOSIS — E66.813 CLASS 3 SEVERE OBESITY DUE TO EXCESS CALORIES WITH SERIOUS COMORBIDITY AND BODY MASS INDEX (BMI) OF 50.0 TO 59.9 IN ADULT: ICD-10-CM

## 2024-12-05 PROCEDURE — RXMED WILLOW AMBULATORY MEDICATION CHARGE

## 2024-12-05 NOTE — PROGRESS NOTES
[facility] Pharmacist Visit - Weight Management  Meghana Munson is a 59 y.o. female was referred to Clinical Pharmacy Team for Obesity.    Referring Provider: Kings Billingsley MD  PCP: Kings Billingsley MD - last visit: 12/2/24, next visit:     Subjective   HPI  PMH significant for HTN.  Special needs/barriers to therapy: None    WEIGHT MANAGEMENT  BMI Readings from Last 1 Encounters:   12/02/24 50.76 kg/m²   Starting weight: 308 lbs (140 kg)  Current weight: 282 lbs    Lab Results   Component Value Date    HGBA1C 5.8 12/02/2024    GLUCOSE 115 (H) 02/08/2024   Hyperglycemia: Denies signs and symptoms    Denies any side effects.    Non-pharmacological therapy  Weight loss techniques attempted:  Self-directed dieting:      Pharmacological therapy  Current Medications: Zepbound 7.5  Previous Medications: none     Adherence: Takes medication as directed and reports no missed doses  Adverse Effects: none    Insurance coverage of weight-loss medications? Yes,    Eligible for copay cards/programs? Yes,      Medication Patient Risks/Cautions Notes   Wegovy (semaglutide) None    Zepbound (tirzepatide) None    Saxenda (liraglutide) None Current backorder of starting doses   Qsymia (phentermine-topiramate) None Controlled substance   Contrave (bupropion-naltrexone) None    Adipex (phentermine) None Controlled substance,  Short-term use only   Adrian/Xenical (orlistat)  Adverse effects likely outweigh benefit.           Secondary Prevention  ASCVD Risk:   The 10-year ASCVD risk score (Irma DK, et al., 2019) is: 16.4%    Values used to calculate the score:      Age: 59 years      Sex: Female      Is Non- : Yes      Diabetic: Yes      Tobacco smoker: No      Systolic Blood Pressure: 116 mmHg      Is BP treated: Yes      HDL Cholesterol: 26.6 mg/dL      Total Cholesterol: 186 mg/dL      Objective   Vitals  BP Readings from Last 2 Encounters:   12/02/24 116/78   09/20/24 147/88     BMI Readings from Last 1  Encounters:   12/02/24 50.76 kg/m²      Labs  A1C  Lab Results   Component Value Date    HGBA1C 5.8 12/02/2024    HGBA1C 6.5 08/26/2024    HGBA1C 6.0 02/12/2024     BMP  Lab Results   Component Value Date    CALCIUM 9.2 02/08/2024     02/08/2024    K 4.4 02/08/2024    CO2 32 02/08/2024     02/08/2024    BUN 13 02/08/2024    CREATININE 0.86 02/08/2024    EGFR 78 02/08/2024     LFTs  Lab Results   Component Value Date    ALT 16 02/08/2024    AST 15 02/08/2024    ALKPHOS 64 02/08/2024    BILITOT 0.5 02/08/2024     FLP  Lab Results   Component Value Date    TRIG 161 (H) 02/08/2024    CHOL 186 02/08/2024    LDLCALC 127 (H) 02/08/2024    HDL 26.6 02/08/2024       Assessment/Plan   Problem List Items Addressed This Visit    None      Weight Management: Current regimen includes Zepbound 7.5mg. . Patient's current weight reported as 282 lbs. Has lost 29 lbs (8.4% of TBW) since starting therapy plan. Due to success, plan to increase.  INCREASE dose of Zepbound to 10mg subcutaneous once weekly  Educate on side effects of dose increase  Reinforce patient that to maximize weight loss and prevent return weight gain, patient has to combine GLP-1 with good diet and exercise  DASH Diet  150 mins per week of exercise  FUV in 4 weeks regarding side effects and dose titration  Continue to focus on lifestyle modifications as discussed.    Patient Education:  Counseled patient on relevant medication mechanisms of action, expectations, side effects, duration of therapy, contraindications, administration, and monitoring parameters.  All questions and concerns addressed. Contact pharmacist with any further questions or concerns prior to next appointment.  Reviewed dietary recommendations: Healthy Plate method    Clinical Pharmacist follow-up: 3/6, Telehealth visit    Seth Vogt PharmD  Clinical Pharmacist  12/05/24    Continue all meds under the continuation of care with the referring provider and clinical pharmacy team.  Verbal  consent to manage patient's drug therapy was obtained from the patient. They were informed they may decline to participate or withdraw from participation in pharmacy services at any time.

## 2024-12-10 ENCOUNTER — PHARMACY VISIT (OUTPATIENT)
Dept: PHARMACY | Facility: CLINIC | Age: 59
End: 2024-12-10
Payer: COMMERCIAL

## 2024-12-31 PROCEDURE — RXMED WILLOW AMBULATORY MEDICATION CHARGE

## 2025-01-06 ENCOUNTER — PHARMACY VISIT (OUTPATIENT)
Dept: PHARMACY | Facility: CLINIC | Age: 60
End: 2025-01-06
Payer: COMMERCIAL

## 2025-01-28 DIAGNOSIS — E66.813 CLASS 3 SEVERE OBESITY DUE TO EXCESS CALORIES WITH SERIOUS COMORBIDITY AND BODY MASS INDEX (BMI) OF 50.0 TO 59.9 IN ADULT: ICD-10-CM

## 2025-01-28 DIAGNOSIS — E11.9 TYPE 2 DIABETES MELLITUS WITHOUT COMPLICATION, WITHOUT LONG-TERM CURRENT USE OF INSULIN (MULTI): ICD-10-CM

## 2025-01-28 DIAGNOSIS — E66.01 CLASS 3 SEVERE OBESITY DUE TO EXCESS CALORIES WITH SERIOUS COMORBIDITY AND BODY MASS INDEX (BMI) OF 50.0 TO 59.9 IN ADULT: ICD-10-CM

## 2025-01-28 NOTE — TELEPHONE ENCOUNTER
Rx for Zepbound medication will no longer be covered at [facility]  It needs to be sent to [facility]    Please Advise

## 2025-01-29 DIAGNOSIS — E11.9 TYPE 2 DIABETES MELLITUS WITHOUT COMPLICATION, WITHOUT LONG-TERM CURRENT USE OF INSULIN (MULTI): ICD-10-CM

## 2025-01-29 DIAGNOSIS — E66.813 CLASS 3 SEVERE OBESITY DUE TO EXCESS CALORIES WITH SERIOUS COMORBIDITY AND BODY MASS INDEX (BMI) OF 50.0 TO 59.9 IN ADULT: ICD-10-CM

## 2025-01-29 DIAGNOSIS — E66.01 CLASS 3 SEVERE OBESITY DUE TO EXCESS CALORIES WITH SERIOUS COMORBIDITY AND BODY MASS INDEX (BMI) OF 50.0 TO 59.9 IN ADULT: ICD-10-CM

## 2025-01-31 PROCEDURE — RXMED WILLOW AMBULATORY MEDICATION CHARGE

## 2025-02-04 ENCOUNTER — PHARMACY VISIT (OUTPATIENT)
Dept: PHARMACY | Facility: CLINIC | Age: 60
End: 2025-02-04
Payer: COMMERCIAL

## 2025-02-07 ENCOUNTER — HOSPITAL ENCOUNTER (OUTPATIENT)
Dept: RADIOLOGY | Facility: HOSPITAL | Age: 60
Discharge: HOME | End: 2025-02-07
Payer: COMMERCIAL

## 2025-02-07 VITALS — WEIGHT: 282 LBS | HEIGHT: 62 IN | BODY MASS INDEX: 51.89 KG/M2

## 2025-02-07 DIAGNOSIS — Z12.31 SCREENING MAMMOGRAM FOR BREAST CANCER: ICD-10-CM

## 2025-02-07 PROCEDURE — 77067 SCR MAMMO BI INCL CAD: CPT

## 2025-02-25 DIAGNOSIS — E11.9 TYPE 2 DIABETES MELLITUS WITHOUT COMPLICATION, WITHOUT LONG-TERM CURRENT USE OF INSULIN (MULTI): ICD-10-CM

## 2025-02-25 DIAGNOSIS — E66.813 CLASS 3 SEVERE OBESITY DUE TO EXCESS CALORIES WITH SERIOUS COMORBIDITY AND BODY MASS INDEX (BMI) OF 50.0 TO 59.9 IN ADULT: ICD-10-CM

## 2025-02-25 DIAGNOSIS — E66.01 CLASS 3 SEVERE OBESITY DUE TO EXCESS CALORIES WITH SERIOUS COMORBIDITY AND BODY MASS INDEX (BMI) OF 50.0 TO 59.9 IN ADULT: ICD-10-CM

## 2025-02-26 RX ORDER — TIRZEPATIDE 10 MG/.5ML
10 INJECTION, SOLUTION SUBCUTANEOUS
Qty: 6 ML | Refills: 0 | Status: SHIPPED | OUTPATIENT
Start: 2025-02-26

## 2025-03-03 ENCOUNTER — TELEPHONE (OUTPATIENT)
Dept: PRIMARY CARE | Facility: CLINIC | Age: 60
End: 2025-03-03
Payer: COMMERCIAL

## 2025-03-03 NOTE — TELEPHONE ENCOUNTER
Patient states she was told to call if she had any symptoms from the weigh loss and the blood pressures medications    She is having light headedness and is cold and clammy      She states she was told she could cut back on one of the BP meds but she isn't sure which one    Please Advise    [phone]

## 2025-03-06 ENCOUNTER — APPOINTMENT (OUTPATIENT)
Dept: PHARMACY | Facility: HOSPITAL | Age: 60
End: 2025-03-06
Payer: COMMERCIAL

## 2025-03-06 DIAGNOSIS — E11.9 TYPE 2 DIABETES MELLITUS WITHOUT COMPLICATION, WITHOUT LONG-TERM CURRENT USE OF INSULIN (MULTI): Primary | ICD-10-CM

## 2025-03-06 DIAGNOSIS — E66.813 CLASS 3 SEVERE OBESITY DUE TO EXCESS CALORIES WITH SERIOUS COMORBIDITY AND BODY MASS INDEX (BMI) OF 50.0 TO 59.9 IN ADULT: ICD-10-CM

## 2025-03-06 DIAGNOSIS — E66.01 CLASS 3 SEVERE OBESITY DUE TO EXCESS CALORIES WITH SERIOUS COMORBIDITY AND BODY MASS INDEX (BMI) OF 50.0 TO 59.9 IN ADULT: ICD-10-CM

## 2025-03-06 NOTE — PROGRESS NOTES
[facility] Pharmacist Visit - Weight Management  Meghana Munson is a 59 y.o. female was referred to Clinical Pharmacy Team for Obesity.    Referring Provider: Kings Billingsley MD  PCP: Kings Billingsley MD - last visit: 3/5/24, next visit:     Subjective   HPI  PMH significant for HTN.  Special needs/barriers to therapy: none    WEIGHT MANAGEMENT  BMI Readings from Last 1 Encounters:   02/07/25 51.24 kg/m²   Starting weight: 308 lbs (140 kg)  Current weight: 257 lbs    Lab Results   Component Value Date    HGBA1C 5.8 12/02/2024    GLUCOSE 115 (H) 02/08/2024   Hyperglycemia: Denies signs and symptoms    Reported a little constipation.    Non-pharmacological therapy  Weight loss techniques attempted:  Self-directed dieting:      Pharmacological therapy  Current Medications: Zepbound 7.5mg  Previous Medications: none     Adherence: Takes medication as directed and reports no missed doses  Adverse Effects: none    Insurance coverage of weight-loss medications? Yes,    Eligible for copay cards/programs? Yes,      Medication Patient Risks/Cautions Notes   Wegovy (semaglutide) None    Zepbound (tirzepatide) None    Saxenda (liraglutide) None Current backorder of starting doses   Qsymia (phentermine-topiramate) None Controlled substance   Contrave (bupropion-naltrexone) None    Adipex (phentermine) None Controlled substance,  Short-term use only   Adrian/Xenical (orlistat)  Adverse effects likely outweigh benefit.         Secondary Prevention  ASCVD Risk:   The 10-year ASCVD risk score (Irma BLOOM, et al., 2019) is: 16.4%    Values used to calculate the score:      Age: 59 years      Sex: Female      Is Non- : Yes      Diabetic: Yes      Tobacco smoker: No      Systolic Blood Pressure: 116 mmHg      Is BP treated: Yes      HDL Cholesterol: 26.6 mg/dL      Total Cholesterol: 186 mg/dL    Objective   Vitals  BP Readings from Last 2 Encounters:   12/02/24 116/78   09/20/24 147/88     BMI Readings from Last 1  Encounters:   02/07/25 51.24 kg/m²      Labs  A1C  Lab Results   Component Value Date    HGBA1C 5.8 12/02/2024    HGBA1C 6.5 08/26/2024    HGBA1C 6.0 02/12/2024     BMP  Lab Results   Component Value Date    CALCIUM 9.2 02/08/2024     02/08/2024    K 4.4 02/08/2024    CO2 32 02/08/2024     02/08/2024    BUN 13 02/08/2024    CREATININE 0.86 02/08/2024    EGFR 78 02/08/2024     LFTs  Lab Results   Component Value Date    ALT 16 02/08/2024    AST 15 02/08/2024    ALKPHOS 64 02/08/2024    BILITOT 0.5 02/08/2024     FLP  Lab Results   Component Value Date    TRIG 161 (H) 02/08/2024    CHOL 186 02/08/2024    LDLCALC 127 (H) 02/08/2024    HDL 26.6 02/08/2024       Assessment/Plan   Problem List Items Addressed This Visit    None      Weight Management: Current regimen includes Zepbound 10mg.. Patient's current weight reported as 257 lbs. Has lost 51 lbs (16.6% of TBW) since starting therapy plan. Due to success, plan to continue.  Continue taking Zepbound 10mg subcutaneous once weekly  Continue to focus on lifestyle modifications as discussed.    Patient Education:  Counseled patient on relevant medication mechanisms of action, expectations, side effects, duration of therapy, contraindications, administration, and monitoring parameters.  All questions and concerns addressed. Contact pharmacist with any further questions or concerns prior to next appointment.  Reviewed dietary recommendations: Healthy Plate method    Clinical Pharmacist follow-up: 6/5, Telehealth visit    Seth Vogt PharmD  Clinical Pharmacist  03/06/25    Continue all meds under the continuation of care with the referring provider and clinical pharmacy team.  Verbal consent to manage patient's drug therapy was obtained from the patient. They were informed they may decline to participate or withdraw from participation in pharmacy services at any time.

## 2025-03-15 ASSESSMENT — PROMIS GLOBAL HEALTH SCALE
RATE_AVERAGE_FATIGUE: MILD
CARRYOUT_SOCIAL_ACTIVITIES: EXCELLENT
RATE_QUALITY_OF_LIFE: VERY GOOD
CARRYOUT_PHYSICAL_ACTIVITIES: COMPLETELY
RATE_PHYSICAL_HEALTH: GOOD
RATE_GENERAL_HEALTH: VERY GOOD
RATE_SOCIAL_SATISFACTION: VERY GOOD
RATE_MENTAL_HEALTH: EXCELLENT
RATE_AVERAGE_PAIN: 3
EMOTIONAL_PROBLEMS: NEVER

## 2025-03-17 ENCOUNTER — APPOINTMENT (OUTPATIENT)
Dept: PRIMARY CARE | Facility: CLINIC | Age: 60
End: 2025-03-17
Payer: COMMERCIAL

## 2025-05-01 ENCOUNTER — APPOINTMENT (OUTPATIENT)
Dept: PRIMARY CARE | Facility: CLINIC | Age: 60
End: 2025-05-01
Payer: COMMERCIAL

## 2025-05-01 DIAGNOSIS — E11.9 TYPE 2 DIABETES MELLITUS WITHOUT COMPLICATION, UNSPECIFIED WHETHER LONG TERM INSULIN USE: Primary | ICD-10-CM

## 2025-05-01 DIAGNOSIS — R73.9 HYPERGLYCEMIA: ICD-10-CM

## 2025-05-01 DIAGNOSIS — E11.9 TYPE 2 DIABETES MELLITUS WITHOUT COMPLICATION, WITHOUT LONG-TERM CURRENT USE OF INSULIN: ICD-10-CM

## 2025-05-01 DIAGNOSIS — I10 HTN (HYPERTENSION), BENIGN: ICD-10-CM

## 2025-05-01 PROCEDURE — 4010F ACE/ARB THERAPY RXD/TAKEN: CPT | Performed by: FAMILY MEDICINE

## 2025-05-01 PROCEDURE — 99213 OFFICE O/P EST LOW 20 MIN: CPT | Performed by: FAMILY MEDICINE

## 2025-05-01 NOTE — PROGRESS NOTES
Subjective   Meghana Munson is a 59 y.o. female who presents for No chief complaint on file..     HPI         There were no vitals taken for this visit.   Objective   Physical Exam  Constitutional:       Appearance: Normal appearance.   HENT:      Head: Normocephalic.   Pulmonary:      Effort: Pulmonary effort is normal.   Musculoskeletal:      Cervical back: Neck supple.   Skin:     General: Skin is warm and dry.   Psychiatric:         Mood and Affect: Mood normal.       No data recorded     No data recorded   No data recorded   Assessment & Plan  Hyperglycemia         Type 2 diabetes mellitus without complication, unspecified whether long term insulin use  This 59-year-old female has lost about 60 pounds since initiating Zepbound.  Her A1c has reduced into the nondiabetic range.  She continues to do well without side effects and continues to follow with clinical pharmacy.  I strongly congratulated and encouraged her to continue with the current treatment plan.  Her blood pressure is stable today and she is not describing any hypotension spells.  However, if she continues to lose weight at this rate it is likely we will be able to reduce the dose or discontinue one of her blood pressure medicines in the near future.  Orders:    Lipid Panel; Future    Albumin-Creatinine Ratio, Urine Random; Future    Comprehensive Metabolic Panel; Future    CBC; Future    Type 2 diabetes mellitus without complication, without long-term current use of insulin  An A1c has reduced into the nondiabetic range.  We will continue to monitor every 6 months to 1 year       HTN (hypertension), benign  Blood pressure remains well-controlled.  We did discuss the possibility of hypotension and that we may need to reduce the dose of her medicines in the future              Please excuse any errors in grammar or translation related to this dictation. Voice recognition software was utilized to prepare this document.

## 2025-05-01 NOTE — ASSESSMENT & PLAN NOTE
An A1c has reduced into the nondiabetic range.  We will continue to monitor every 6 months to 1 year        TROS[facility]UM  Last Written Prescription Date:  3/28/18  Last Fill Quantity: 40,  # refills: 2   Last office visit: 9/12/2018 with prescribing provider:     Future Office Visit:   Next 5 appointments (look out 90 days)     Jan 09, 2019 11:00 AM CST   [facility] Visit with Martina Andrade MD   [facility]    [address] [place] [id number] [phone]                 YRN Brown

## 2025-05-01 NOTE — ASSESSMENT & PLAN NOTE
Blood pressure remains well-controlled.  We did discuss the possibility of hypotension and that we may need to reduce the dose of her medicines in the future

## 2025-05-02 DIAGNOSIS — E11.9 TYPE 2 DIABETES MELLITUS WITHOUT COMPLICATION, WITHOUT LONG-TERM CURRENT USE OF INSULIN: ICD-10-CM

## 2025-05-02 DIAGNOSIS — E66.813 CLASS 3 SEVERE OBESITY DUE TO EXCESS CALORIES WITH SERIOUS COMORBIDITY AND BODY MASS INDEX (BMI) OF 50.0 TO 59.9 IN ADULT: ICD-10-CM

## 2025-05-02 RX ORDER — TIRZEPATIDE 10 MG/.5ML
10 INJECTION, SOLUTION SUBCUTANEOUS
Qty: 6 ML | Refills: 3 | Status: SHIPPED | OUTPATIENT
Start: 2025-05-02

## 2025-06-02 ENCOUNTER — APPOINTMENT (OUTPATIENT)
Dept: PRIMARY CARE | Facility: CLINIC | Age: 60
End: 2025-06-02
Payer: COMMERCIAL

## 2025-06-05 ENCOUNTER — APPOINTMENT (OUTPATIENT)
Dept: PHARMACY | Facility: HOSPITAL | Age: 60
End: 2025-06-05
Payer: COMMERCIAL

## 2025-06-05 DIAGNOSIS — E11.9 TYPE 2 DIABETES MELLITUS WITHOUT COMPLICATION, WITHOUT LONG-TERM CURRENT USE OF INSULIN: Primary | ICD-10-CM

## 2025-06-05 DIAGNOSIS — E66.813 CLASS 3 SEVERE OBESITY DUE TO EXCESS CALORIES WITH SERIOUS COMORBIDITY AND BODY MASS INDEX (BMI) OF 50.0 TO 59.9 IN ADULT: ICD-10-CM

## 2025-06-05 RX ORDER — TIRZEPATIDE 12.5 MG/.5ML
12.5 INJECTION, SOLUTION SUBCUTANEOUS
Qty: 6 ML | Refills: 0 | Status: SHIPPED | OUTPATIENT
Start: 2025-06-05

## 2025-06-05 NOTE — PROGRESS NOTES
[facility] Pharmacist Visit - Weight Management  Meghana Munson is a 59 y.o. female was referred to Clinical Pharmacy Team for Obesity.    Referring Provider: Kings Billingsley MD  PCP: Kings Billingsley MD - last visit: 3/5/24, next visit: -    Subjective   HPI  PMH significant for HTN.  Special needs/barriers to therapy: none    WEIGHT MANAGEMENT  BMI Readings from Last 1 Encounters:   02/07/25 51.24 kg/m²   Starting weight: 308 lbs (140 kg)  Current weight: 245 lbs    Lab Results   Component Value Date    HGBA1C 5.8 12/02/2024    GLUCOSE 115 (H) 02/08/2024   Hyperglycemia: Denies signs and symptoms    Denies any more issues or side effects for 1 month. Would like to move up    Non-pharmacological therapy  Weight loss techniques attempted:  Self-directed dieting:       Pharmacological therapy  Current Medications: Zepbound 7.5mg  Previous Medications: none      Adherence: Takes medication as directed and reports no missed doses  Adverse Effects: none     Insurance coverage of weight-loss medications? Yes,    Eligible for copay cards/programs? Yes,         Medication Patient Risks/Cautions Notes   Wegovy (semaglutide) No known concerns    Zepbound (tirzepatide) No known concerns    Saxenda (liraglutide) No known concerns Current backorder of starting doses   Qsymia (phentermine-topiramate) No known concerns Controlled substance   Contrave (bupropion-naltrexone) No known concerns    Adipex (phentermine) No known concerns Controlled substance,  Short-term use only   Adrian/Xenical (orlistat)  Adverse effects likely outweigh benefit.         Secondary Prevention  ASCVD Risk:   The 10-year ASCVD risk score (Irma BLOOM, et al., 2019) is: 16.4%    Values used to calculate the score:      Age: 59 years      Sex: Female      Is Non- : Yes      Diabetic: Yes      Tobacco smoker: No      Systolic Blood Pressure: 116 mmHg      Is BP treated: Yes      HDL Cholesterol: 26.6 mg/dL      Total Cholesterol: 186  mg/dL      Objective   Vitals  BP Readings from Last 2 Encounters:   12/02/24 116/78   09/20/24 147/88     BMI Readings from Last 1 Encounters:   02/07/25 51.24 kg/m²      Labs  A1C  Lab Results   Component Value Date    HGBA1C 5.8 12/02/2024    HGBA1C 6.5 08/26/2024    HGBA1C 6.0 02/12/2024     BMP  Lab Results   Component Value Date    CALCIUM 9.2 02/08/2024     02/08/2024    K 4.4 02/08/2024    CO2 32 02/08/2024     02/08/2024    BUN 13 02/08/2024    CREATININE 0.86 02/08/2024    EGFR 78 02/08/2024     LFTs  Lab Results   Component Value Date    ALT 16 02/08/2024    AST 15 02/08/2024    ALKPHOS 64 02/08/2024    BILITOT 0.5 02/08/2024     FLP  Lab Results   Component Value Date    TRIG 161 (H) 02/08/2024    CHOL 186 02/08/2024    LDLCALC 127 (H) 02/08/2024    HDL 26.6 02/08/2024       Assessment/Plan   Problem List Items Addressed This Visit    None      Weight Management: Current regimen includes Zepbound 10mg.. Patient's current weight reported as 245 lbs. Has lost 63 lbs (20% of TBW) since starting therapy plan. Due to success, plan to continue. .  INCREASE dose of Zepbound to 12.5mg subcutaneous once weekly  Educate on side effects of dose increase  Reinforce patient that to maximize weight loss and prevent return weight gain, patient has to combine GLP-1 with good diet and exercise  DASH Diet  150 mins per week of exercise  FUV in 4 weeks regarding side effects and dose titration  Continue to focus on lifestyle modifications as discussed.    Patient Education:  Counseled patient on relevant medication mechanisms of action, expectations, side effects, duration of therapy, contraindications, administration, and monitoring parameters.  All questions and concerns addressed. Contact pharmacist with any further questions or concerns prior to next appointment.  Reviewed dietary recommendations: Healthy Plate method    Clinical Pharmacist follow-up: 9/4, Telehealth visit    Seth Vogt PharmD  Clinical  Pharmacist  06/05/25    Continue all meds under the continuation of care with the referring provider and clinical pharmacy team.  Verbal consent to manage patient's drug therapy was obtained from the patient. They were informed they may decline to participate or withdraw from participation in pharmacy services at any time.

## 2025-06-07 LAB
ALBUMIN SERPL-MCNC: 4.1 G/DL (ref 3.6–5.1)
ALBUMIN/CREAT UR: NORMAL
ALP SERPL-CCNC: 55 U/L (ref 37–153)
ALT SERPL-CCNC: 10 U/L (ref 6–29)
ANION GAP SERPL CALCULATED.4IONS-SCNC: 8 MMOL/L (CALC) (ref 7–17)
AST SERPL-CCNC: 14 U/L (ref 10–35)
BILIRUB SERPL-MCNC: 0.5 MG/DL (ref 0.2–1.2)
BUN SERPL-MCNC: 12 MG/DL (ref 7–25)
CALCIUM SERPL-MCNC: 9 MG/DL (ref 8.6–10.4)
CHLORIDE SERPL-SCNC: 107 MMOL/L (ref 98–110)
CHOLEST SERPL-MCNC: 194 MG/DL
CHOLEST/HDLC SERPL: 5.9 (CALC)
CO2 SERPL-SCNC: 25 MMOL/L (ref 20–32)
CREAT SERPL-MCNC: 0.72 MG/DL (ref 0.5–1.03)
CREAT UR-MCNC: NORMAL MG/DL
EGFRCR SERPLBLD CKD-EPI 2021: 96 ML/MIN/1.73M2
ERYTHROCYTE [DISTWIDTH] IN BLOOD BY AUTOMATED COUNT: 13.5 % (ref 11–15)
GLUCOSE SERPL-MCNC: 93 MG/DL (ref 65–99)
HCT VFR BLD AUTO: 43.3 % (ref 35–45)
HDLC SERPL-MCNC: 33 MG/DL
HGB BLD-MCNC: 13.7 G/DL (ref 11.7–15.5)
LDLC SERPL CALC-MCNC: 137 MG/DL (CALC)
MCH RBC QN AUTO: 30 PG (ref 27–33)
MCHC RBC AUTO-ENTMCNC: 31.6 G/DL (ref 32–36)
MCV RBC AUTO: 94.7 FL (ref 80–100)
MICROALBUMIN UR-MCNC: NORMAL
NONHDLC SERPL-MCNC: 161 MG/DL (CALC)
PLATELET # BLD AUTO: 268 THOUSAND/UL (ref 140–400)
PMV BLD REES-ECKER: 10.9 FL (ref 7.5–12.5)
POTASSIUM SERPL-SCNC: 4.5 MMOL/L (ref 3.5–5.3)
PROT SERPL-MCNC: 6.6 G/DL (ref 6.1–8.1)
RBC # BLD AUTO: 4.57 MILLION/UL (ref 3.8–5.1)
SODIUM SERPL-SCNC: 140 MMOL/L (ref 135–146)
TRIGL SERPL-MCNC: 122 MG/DL
WBC # BLD AUTO: 5.3 THOUSAND/UL (ref 3.8–10.8)

## 2025-06-09 LAB
ALBUMIN SERPL-MCNC: 4.1 G/DL (ref 3.6–5.1)
ALBUMIN/CREAT UR: 88 MG/G CREAT
ALP SERPL-CCNC: 55 U/L (ref 37–153)
ALT SERPL-CCNC: 10 U/L (ref 6–29)
ANION GAP SERPL CALCULATED.4IONS-SCNC: 8 MMOL/L (CALC) (ref 7–17)
AST SERPL-CCNC: 14 U/L (ref 10–35)
BILIRUB SERPL-MCNC: 0.5 MG/DL (ref 0.2–1.2)
BUN SERPL-MCNC: 12 MG/DL (ref 7–25)
CALCIUM SERPL-MCNC: 9 MG/DL (ref 8.6–10.4)
CHLORIDE SERPL-SCNC: 107 MMOL/L (ref 98–110)
CHOLEST SERPL-MCNC: 194 MG/DL
CHOLEST/HDLC SERPL: 5.9 (CALC)
CO2 SERPL-SCNC: 25 MMOL/L (ref 20–32)
CREAT SERPL-MCNC: 0.72 MG/DL (ref 0.5–1.03)
CREAT UR-MCNC: 141 MG/DL (ref 20–275)
EGFRCR SERPLBLD CKD-EPI 2021: 96 ML/MIN/1.73M2
ERYTHROCYTE [DISTWIDTH] IN BLOOD BY AUTOMATED COUNT: 13.5 % (ref 11–15)
GLUCOSE SERPL-MCNC: 93 MG/DL (ref 65–99)
HCT VFR BLD AUTO: 43.3 % (ref 35–45)
HDLC SERPL-MCNC: 33 MG/DL
HGB BLD-MCNC: 13.7 G/DL (ref 11.7–15.5)
LDLC SERPL CALC-MCNC: 137 MG/DL (CALC)
MCH RBC QN AUTO: 30 PG (ref 27–33)
MCHC RBC AUTO-ENTMCNC: 31.6 G/DL (ref 32–36)
MCV RBC AUTO: 94.7 FL (ref 80–100)
MICROALBUMIN UR-MCNC: 12.4 MG/DL
NONHDLC SERPL-MCNC: 161 MG/DL (CALC)
PLATELET # BLD AUTO: 268 THOUSAND/UL (ref 140–400)
PMV BLD REES-ECKER: 10.9 FL (ref 7.5–12.5)
POTASSIUM SERPL-SCNC: 4.5 MMOL/L (ref 3.5–5.3)
PROT SERPL-MCNC: 6.6 G/DL (ref 6.1–8.1)
RBC # BLD AUTO: 4.57 MILLION/UL (ref 3.8–5.1)
SODIUM SERPL-SCNC: 140 MMOL/L (ref 135–146)
TRIGL SERPL-MCNC: 122 MG/DL
WBC # BLD AUTO: 5.3 THOUSAND/UL (ref 3.8–10.8)

## 2025-09-02 DIAGNOSIS — I10 HTN (HYPERTENSION), BENIGN: ICD-10-CM

## 2025-09-02 RX ORDER — LOSARTAN POTASSIUM 50 MG/1
50 TABLET ORAL DAILY
Qty: 30 TABLET | Refills: 11 | Status: SHIPPED | OUTPATIENT
Start: 2025-09-02

## 2025-09-03 DIAGNOSIS — I10 HTN (HYPERTENSION), BENIGN: ICD-10-CM

## 2025-09-03 RX ORDER — AMLODIPINE BESYLATE 10 MG/1
10 TABLET ORAL DAILY
Qty: 90 TABLET | Refills: 3 | Status: SHIPPED | OUTPATIENT
Start: 2025-09-03

## 2025-09-04 ENCOUNTER — APPOINTMENT (OUTPATIENT)
Dept: PHARMACY | Facility: HOSPITAL | Age: 60
End: 2025-09-04
Payer: COMMERCIAL

## 2025-12-04 ENCOUNTER — APPOINTMENT (OUTPATIENT)
Dept: PHARMACY | Facility: HOSPITAL | Age: 60
End: 2025-12-04
Payer: COMMERCIAL